# Patient Record
Sex: FEMALE | URBAN - NONMETROPOLITAN AREA
[De-identification: names, ages, dates, MRNs, and addresses within clinical notes are randomized per-mention and may not be internally consistent; named-entity substitution may affect disease eponyms.]

---

## 2019-09-04 ENCOUNTER — APPOINTMENT (RX ONLY)
Dept: URBAN - NONMETROPOLITAN AREA CLINIC 20 | Facility: CLINIC | Age: 64
Setting detail: DERMATOLOGY
End: 2019-09-04

## 2019-09-04 DIAGNOSIS — Z85.828 PERSONAL HISTORY OF OTHER MALIGNANT NEOPLASM OF SKIN: ICD-10-CM

## 2019-09-04 DIAGNOSIS — L40.0 PSORIASIS VULGARIS: ICD-10-CM

## 2019-09-04 DIAGNOSIS — L57.0 ACTINIC KERATOSIS: ICD-10-CM

## 2019-09-04 PROBLEM — M12.9 ARTHROPATHY, UNSPECIFIED: Status: ACTIVE | Noted: 2019-09-04

## 2019-09-04 PROCEDURE — ? PRESCRIPTION SAMPLES PROVIDED

## 2019-09-04 PROCEDURE — ? LIQUID NITROGEN

## 2019-09-04 PROCEDURE — ? TREATMENT REGIMEN

## 2019-09-04 PROCEDURE — 99202 OFFICE O/P NEW SF 15 MIN: CPT | Mod: 25

## 2019-09-04 PROCEDURE — ? OTHER

## 2019-09-04 PROCEDURE — 17000 DESTRUCT PREMALG LESION: CPT

## 2019-09-04 PROCEDURE — ? COUNSELING

## 2019-09-04 ASSESSMENT — LOCATION DETAILED DESCRIPTION DERM
LOCATION DETAILED: LEFT NARIS
LOCATION DETAILED: RIGHT ULNAR PALM
LOCATION DETAILED: NASAL SUPRATIP

## 2019-09-04 ASSESSMENT — LOCATION ZONE DERM
LOCATION ZONE: NOSE
LOCATION ZONE: HAND

## 2019-09-04 ASSESSMENT — PGA PSORIASIS: PGA PSORIASIS: MILD (MILD PLAQUE ELEVATION, LIGHT ERYTHEMA, FINE SCALE PREDOMINATES)

## 2019-09-04 ASSESSMENT — LOCATION SIMPLE DESCRIPTION DERM
LOCATION SIMPLE: NOSE
LOCATION SIMPLE: LEFT NOSE
LOCATION SIMPLE: RIGHT HAND

## 2019-09-04 ASSESSMENT — BSA PSORIASIS: % BODY COVERED IN PSORIASIS: 1

## 2019-09-04 NOTE — PROCEDURE: TREATMENT REGIMEN
LOV 5/31/19. Refill pended and routed to Dr. Liza Amador to approve.
Action 2: Continue
Other Instructions: Apply BID to right palm for up to one month and then as needed for flares
Detail Level: Zone
Samples Given: Eucrisa ointment

## 2019-09-04 NOTE — PROCEDURE: OTHER
Note Text (......Xxx Chief Complaint.): This diagnosis correlates with the
Other (Free Text): Patient will call to update us on effectiveness of Eucrisa on her right hand in 1 - 3 weeks.If helping will send in RX for Eucrisa. Pt states topical steroids really don't help
Detail Level: Zone

## 2019-09-04 NOTE — PROCEDURE: PRESCRIPTION SAMPLES PROVIDED
Samples Given: 3 Eucrisa ointment
Expiration Date (Optional): 6/2020
Lot/Batch Number (Optional): [id number]
Detail Level: Zone

## 2019-09-04 NOTE — HPI: RASH (PSORIASIS)
Do You Have A Family History Of Psoriasis?: yes
How Severe Is Your Psoriasis?: moderate
Is This A New Presentation, Or A Follow-Up?: Psoriasis
Additional History: Patient is treated with Otezla by her Rheumatologist Dr. Richard in [place]. She has been on Otezla since 2/2019 with good results. Pt is pleased with the results of Otezla

## 2020-01-01 NOTE — HPI: NON-MELANOMA SKIN CANCER F/U (HISTORY OF NMSC)
What Is The Reason For Today's Visit?: History of Non-Melanoma Skin Cancer
How Many Skin Cancers Have You Had?: one
When Was Your Last Cancer Diagnosed?: 2018
13.97

## 2021-09-21 ENCOUNTER — APPOINTMENT (RX ONLY)
Dept: URBAN - NONMETROPOLITAN AREA CLINIC 20 | Facility: CLINIC | Age: 66
Setting detail: DERMATOLOGY
End: 2021-09-21

## 2021-09-21 DIAGNOSIS — I83.9 ASYMPTOMATIC VARICOSE VEINS OF LOWER EXTREMITIES: ICD-10-CM

## 2021-09-21 DIAGNOSIS — Z85.828 PERSONAL HISTORY OF OTHER MALIGNANT NEOPLASM OF SKIN: ICD-10-CM | Status: RESOLVED

## 2021-09-21 PROBLEM — I83.93 ASYMPTOMATIC VARICOSE VEINS OF BILATERAL LOWER EXTREMITIES: Status: ACTIVE | Noted: 2021-09-21

## 2021-09-21 PROBLEM — D48.5 NEOPLASM OF UNCERTAIN BEHAVIOR OF SKIN: Status: ACTIVE | Noted: 2021-09-21

## 2021-09-21 PROCEDURE — ? COUNSELING

## 2021-09-21 PROCEDURE — 11102 TANGNTL BX SKIN SINGLE LES: CPT

## 2021-09-21 PROCEDURE — ? BIOPSY BY SHAVE METHOD

## 2021-09-21 PROCEDURE — 99212 OFFICE O/P EST SF 10 MIN: CPT | Mod: 25

## 2021-09-21 ASSESSMENT — LOCATION DETAILED DESCRIPTION DERM
LOCATION DETAILED: RIGHT PROXIMAL PRETIBIAL REGION
LOCATION DETAILED: LEFT PROXIMAL PRETIBIAL REGION

## 2021-09-21 ASSESSMENT — LOCATION SIMPLE DESCRIPTION DERM
LOCATION SIMPLE: RIGHT PRETIBIAL REGION
LOCATION SIMPLE: LEFT PRETIBIAL REGION

## 2021-09-21 ASSESSMENT — LOCATION ZONE DERM: LOCATION ZONE: LEG

## 2021-09-21 NOTE — PROCEDURE: BIOPSY BY SHAVE METHOD
